# Patient Record
Sex: MALE | Race: WHITE
[De-identification: names, ages, dates, MRNs, and addresses within clinical notes are randomized per-mention and may not be internally consistent; named-entity substitution may affect disease eponyms.]

---

## 2020-03-12 ENCOUNTER — HOSPITAL ENCOUNTER (EMERGENCY)
Dept: HOSPITAL 61 - ER | Age: 30
LOS: 1 days | Discharge: HOME | End: 2020-03-13
Payer: SELF-PAY

## 2020-03-12 VITALS — WEIGHT: 190 LBS | HEIGHT: 69 IN | BODY MASS INDEX: 28.14 KG/M2

## 2020-03-12 DIAGNOSIS — Z98.890: ICD-10-CM

## 2020-03-12 DIAGNOSIS — F12.90: ICD-10-CM

## 2020-03-12 DIAGNOSIS — R21: ICD-10-CM

## 2020-03-12 DIAGNOSIS — F17.200: ICD-10-CM

## 2020-03-12 DIAGNOSIS — B35.4: Primary | ICD-10-CM

## 2020-03-12 DIAGNOSIS — J45.909: ICD-10-CM

## 2020-03-12 PROCEDURE — 99283 EMERGENCY DEPT VISIT LOW MDM: CPT

## 2020-03-13 VITALS — DIASTOLIC BLOOD PRESSURE: 82 MMHG | SYSTOLIC BLOOD PRESSURE: 123 MMHG

## 2020-03-13 NOTE — PHYS DOC
Past Medical History


Past Medical History:  Asthma


Additional Past Medical Histor:  HERNIA


 (LULY LIU)


Past Surgical History:  Other


Additional Past Surgical Histo:  HERNIA SURGERY X4


 (LULY LIU)


Smoking Status:  Current Some Day Smoker


Alcohol Use:  Occasionally


Drug Use:  Marijuana


 (LULY LIU)


Attending Signature


I have participated in the care of this patient and I have reviewed and agree 

with all pertinent clinical information above including history, exam, and 

recommendations.





 (KATY JUÁREZ MD)





Adult General


Chief Complaint


Chief Complaint:  SKIN RASH/ABSCESS





[facility]


HPI





Patient is a 29  year old male who presents with rash on his left leg.  He 

states it started a week ago.  Denies any itching or fever.  Denies any other 

symptoms.





Complete ROS were reviewed and found to be within normal limits, except as 

documented in the HPI


 (LULY LIU)





Allergies


Allergies





Allergies








Coded Allergies Type Severity Reaction Last Updated Verified


 


  No Known Drug Allergies    3/13/20 No





 (KATY JUÁREZ MD)





Physical Exam


Physical Exam





Constitutional: Well developed, well nourished, no acute distress, non-toxic 

appearance. []


Skin: bullseye rash that is quarter size to left leg.


Neurologic: Alert and oriented X 3, normal motor function, normal sensory 

function, no focal deficits noted. []


Psychologic: Affect normal, judgement normal, mood normal. []


 (LULY LIU)





Current Patient Data


Vital Signs





                                   Vital Signs








  Date Time  Temp Pulse Resp B/P (MAP) Pulse Ox O2 Delivery O2 Flow Rate FiO2


 


3/13/20 00:44 98.0 69 18  95 Room Air  





 98.0       





 (KATY JUÁREZ MD)





EKG


EKG


[]


 (LULY LIU)





Radiology/Procedures


Radiology/Procedures


[]


 (LULY LIU)





Course & Med Decision Making


Course & Med Decision Making


Pertinent Labs and Imaging studies reviewed. (See chart for details)





Patient appears to have ringworm.  Placed on topical fungal medicine.


 (LULY LIU)





Dragon Disclaimer


Dragon Disclaimer


This electronic medical record was generated, in whole or in part, using a voice

 recognition dictation system.


 (LULY LIU)





Departure


Departure


Impression:  


   Primary Impression:  


   Tinea corporis


Disposition:  01 HOME, SELF-CARE


Condition:  STABLE


Referrals:  


NO PCP (PCP)


Patient Instructions:  Body Ringworm





Additional Instructions:  


Thank you for visiting [facility]. We appreciate you trusting us 

with your care. If any additional problems come up don't hesitate to return to 

visit us. Please follow up with your primary care provider so they can plan 

additional care if needed and know about the problem that you had. If symptoms 

worsen come back to the Emergency Department. Any concerning symptoms that start

 such as chest pain, shortness of air, weakness or numbness on one side of the 

body, running high fevers or any other concerning symptoms return to the ER.





Please fill your medications at any pharmacy and follow the prescription 

instructions.


Scripts


Ketoconazole (KETOCONAZOLE) 15 Gm Cream..g.


1 TESFAYE TP BID for 30 Days, #60 GM 1 Refill


   Prov: LULY LIU         3/13/20











LULY LIU          Mar 13, 2020 01:11


KATY JURÁEZ MD              Mar 13, 2020 03:11